# Patient Record
Sex: FEMALE | URBAN - METROPOLITAN AREA
[De-identification: names, ages, dates, MRNs, and addresses within clinical notes are randomized per-mention and may not be internally consistent; named-entity substitution may affect disease eponyms.]

---

## 2020-09-22 ENCOUNTER — HOSPITAL ENCOUNTER (OUTPATIENT)
Facility: HOSPITAL | Age: 30
Setting detail: OBSERVATION
Discharge: HOME | End: 2020-09-23
Attending: OBSTETRICS & GYNECOLOGY | Admitting: OBSTETRICS & GYNECOLOGY
Payer: MEDICAID

## 2020-09-22 ENCOUNTER — APPOINTMENT (OUTPATIENT)
Dept: ULTRASOUND IMAGING | Facility: HOSPITAL | Age: 30
End: 2020-09-22
Attending: OBSTETRICS & GYNECOLOGY
Payer: MEDICAID

## 2020-09-22 VITALS
BODY MASS INDEX: 21.26 KG/M2 | WEIGHT: 120 LBS | TEMPERATURE: 98 F | SYSTOLIC BLOOD PRESSURE: 108 MMHG | HEART RATE: 86 BPM | HEIGHT: 63 IN | RESPIRATION RATE: 16 BRPM | DIASTOLIC BLOOD PRESSURE: 64 MMHG

## 2020-09-22 PROBLEM — Z34.90 PREGNANCY: Status: ACTIVE | Noted: 2020-09-22

## 2020-09-22 LAB
ANTIBODY SCREEN: NEGATIVE
APTT PPP: 31 SECONDS (ref 23.2–35.3)
FIBRINOGEN PPP-MCNC: 329 MG/DL (ref 176–491)
FSP PPP LA-ACNC: NEGATIVE MCG/ML
INR BLD: 1.14 (ref 0.9–1.2)
INR BLD: 1.19 (ref 0.9–1.2)
PROTHROMBIN TIME: 14.4 SECONDS (ref 11.8–14.5)
PROTHROMBIN TIME: 14.9 SECONDS (ref 11.8–14.5)
RH BLOOD TYPE: POSITIVE

## 2020-09-22 PROCEDURE — 59025 FETAL NON-STRESS TEST: CPT | Performed by: OBSTETRICS & GYNECOLOGY

## 2020-09-22 PROCEDURE — 76811 OB US DETAILED SNGL FETUS: CPT | Performed by: OBSTETRICS & GYNECOLOGY

## 2020-09-23 LAB — HGB F MFR BLD KLEIH BETKE: <0.1 %

## 2020-09-23 NOTE — TRIAGE
[facility]      Triage Note    Sina Miriams Patient Status:  Observation    1990 MRN [id number]   Location [address] Attending Russel Carlson MD   Hosp Day # 0 PCP None Pcp        Para:

## 2020-09-23 NOTE — PROGRESS NOTES
Discharged to home per ambulatory in stable condition with written and verbal instructions. Patient verbalizes understanding of information given.  Pt sent to ER

## 2020-09-23 NOTE — TRIAGE
[facility]      Triage Note    Kylee Lorenzo Patient Status:  Observation    1990 MRN [id number]   Location [address] Attending Afia Juarez MD   Hosp Day # 0 PCP None Pcp        Para: